# Patient Record
Sex: MALE | Race: WHITE | NOT HISPANIC OR LATINO | Employment: OTHER | URBAN - NONMETROPOLITAN AREA
[De-identification: names, ages, dates, MRNs, and addresses within clinical notes are randomized per-mention and may not be internally consistent; named-entity substitution may affect disease eponyms.]

---

## 2023-08-29 PROBLEM — G62.9 PERIPHERAL NEUROPATHY: Status: ACTIVE | Noted: 2023-08-29

## 2023-08-29 RX ORDER — SPIRONOLACTONE AND HYDROCHLOROTHIAZIDE 25; 25 MG/1; MG/1
TABLET ORAL
COMMUNITY
Start: 2023-03-17

## 2023-08-29 RX ORDER — LOSARTAN POTASSIUM 100 MG/1
TABLET ORAL
COMMUNITY
Start: 2023-03-17

## 2023-08-29 RX ORDER — MELOXICAM 15 MG/1
TABLET ORAL
COMMUNITY
Start: 2023-04-19

## 2023-08-29 RX ORDER — GLIMEPIRIDE 2 MG/1
TABLET ORAL
COMMUNITY
Start: 2023-04-19

## 2023-08-29 RX ORDER — FENOFIBRATE 160 MG/1
TABLET ORAL
COMMUNITY
Start: 2023-03-17

## 2023-08-29 RX ORDER — METFORMIN HYDROCHLORIDE 500 MG/1
TABLET ORAL
COMMUNITY
Start: 2023-03-17

## 2023-08-29 RX ORDER — ATORVASTATIN CALCIUM 80 MG/1
TABLET, FILM COATED ORAL
COMMUNITY
Start: 2023-03-17

## 2023-08-29 RX ORDER — ATENOLOL 25 MG/1
TABLET ORAL
COMMUNITY
Start: 2023-03-17

## 2023-08-29 RX ORDER — AMLODIPINE BESYLATE 10 MG/1
TABLET ORAL
COMMUNITY
Start: 2023-03-17

## 2023-10-05 ENCOUNTER — TELEPHONE VISIT (OUTPATIENT)
Dept: HEMATOLOGY/ONCOLOGY | Facility: CLINIC | Age: 73
End: 2023-10-05
Payer: MEDICARE

## 2023-10-05 ENCOUNTER — APPOINTMENT (OUTPATIENT)
Dept: HEMATOLOGY/ONCOLOGY | Facility: CLINIC | Age: 73
End: 2023-10-05
Payer: MEDICARE

## 2023-10-05 DIAGNOSIS — C91.10 CLL (CHRONIC LYMPHOCYTIC LEUKEMIA) (MULTI): Primary | ICD-10-CM

## 2023-10-05 DIAGNOSIS — C91.10 CLL (CHRONIC LYMPHOCYTIC LEUKEMIA) (MULTI): ICD-10-CM

## 2023-10-05 PROCEDURE — 99213 OFFICE O/P EST LOW 20 MIN: CPT | Performed by: INTERNAL MEDICINE

## 2023-10-05 NOTE — PROGRESS NOTES
Cancer History:   Treatment Synopsis:    Chronic lymphocytic leukemia, stage 0, diagnosed in May 2017. The patient presented with leukocytosis/lymphocytosis.      History of left testicular mass status post orchiectomy on January 11, 2018. 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 Surgical margins negative for malignancy        History of Present Illness:      ID Statement:    GLENDY GILLILAND is a 73 year old Male        Chief Complaint: follow-up   Interval History:    The patient had called for a follow-up on October 5,23 regarding his history of Stage 0 CLL. He is asymptomatic and experiences no fevers or incidences of lymphadenopathy.  Denies any history of night sweats, lymphadenopathy, fevers and localized or systemic infection and bleeding.      Past medical history:  1. Obesity  2. Diabetes mellitus  3. Hypertension  4. Hyperlipidemia     Past surgical history:  1. Both knee replacement in October 2013.  2. Left hip replacement in February 2012.  3. History of prostate cancer, seed implants it [place] [facility] in many 2016.     Review of Systems:   ·  System Review All other systems have been reviewed and are negative for complaint.      · Constitutional NEGATIVE: Weight Loss     Comments no repeated infections or lymphadenopathy      · Hematologic/Lymph NEGATIVE: Night Sweats            Allergies and Intolerances:       Allergies:         No Known Allergies: Active     Outpatient Medication Profile:  * Patient Currently Takes Medications as of 06-Apr-2023 10:17 documented in Structured Notes         fenofibrate 160 mg oral tablet : Last Dose Taken:  , 1 tab(s) orally once a day         metFORMIN 500 mg oral tablet: Last Dose Taken:  , 1 tab(s) orally 2 times  a day         aspirin 81 mg oral tablet, disintegrating: Last Dose Taken:  , 1 tab(s)  orally once a day         Daily Multi oral tablet: Last Dose Taken:  , 1 tab(s) orally once a day         Tylenol 500 mg oral tablet:  Last Dose Taken:  , 2 tab(s) orally every 6  hours, As Needed         spironolactone-hydrochlorothiazide 25 mg-25 mg oral tablet: Last Dose Taken:   , 1 tab(s) orally once a day         amLODIPine 10 mg oral tablet: Last Dose Taken:  , 1 tab(s) orally once  a day         losartan 100 mg oral tablet: Last Dose Taken:  , 1 tab(s) orally once a  day         atenolol 25 mg oral tablet: Last Dose Taken:  , 1 tab(s) orally 2 times  a day         glipiZIDE 10 mg oral tablet: Last Dose Taken:  , 1 tab(s) orally once  a day         atorvastatin 80 mg oral tablet: Last Dose Taken:  , 1 tab(s) orally once  a day         meloxicam 15 mg oral tablet: Last Dose Taken:  , 1 tab(s) orally once  a day             Medical History:         DM (diabetes mellitus): ICD-10: E11.9, Status: Active         HLD (hyperlipidemia): ICD-10: E78.5, Status: Active         HTN (hypertension): ICD-10: I10, Status: Active         Obesity: ICD-10: E66.9, Status: Active        Social History:   Social Substance History:  ·  Smoking Status former smoker   ·  Additional History     Smoked 1 pack per day for 40 years, quit 40 years ago(1)           Vitals and Measurements:   Vitals: Temp: 36.9  HR: 76  RR: 18  BP: 204/72  SPO2%:   96   Measurements: HT(cm): 166.2  WT(kg): 156.6  BSA:  2.68  BMI:  56.6   Second Set of Vitals/Vitals Comment: 126/83 (2)      Physical Exam:      Constitutional: Well developed, awake/alert/oriented  x3, no distress, alert and cooperative   Eyes: PERRL, EOMI, clear sclera   ENMT: mucous membranes moist, no apparent injury,  no lesions seen   Head/Neck: Neck supple, no apparent injury, thyroid  without mass or tenderness, No JVD, trachea midline, no bruits   Respiratory/Thorax: Patent airways, CTAB, normal  breath sounds with good chest expansion, thorax symmetric   Cardiovascular: Regular, rate and rhythm, no murmurs,  2+ equal pulses of the extremities, normal S 1and S 2   Gastrointestinal: Nondistended, soft, non-tender,  no  rebound tenderness or guarding, no masses palpable, no organomegaly, +BS, no bruits   Genitourinary: No Discharge, vesicles or other abnormalities   Musculoskeletal: ROM intact, no joint swelling, normal  strength   Extremities: normal extremities, no cyanosis edema,  contusions or wounds, no clubbing   Neurological: alert and oriented x3, intact senses,  motor, response and reflexes, normal strength   Breast: No masses, tenderness, no discharge or discoloration   Lymphatic: No significant lymphadenopathy   Psychological: Appropriate mood and behavior   Skin: Warm and dry, no lesions, no rashes         Lab Results:     ·  Results        CBC date/time       WBC     HGB     HCT     PLT     Neut      17-May-2017 13:07   14.8(H) 16.2    50.1    231     5.33     BMP date/time       NA              K               CL              CO2             BUN             CREAT             17-May-2017 13:07   143             4.4             106             N/A             24(H)           0.87     LDH date/time       LDH     10-May-2017 10:40   N/A     Assessment and Plan:      Assessment and Plan:   Assessment:    The patient had come for follow-up on 4/6/23.     1. B cell CLL, stage 0  The patient is being followed clinically. Denied any new symptoms of weight loss, fevers, lymphadenopathy, or repeated infection. Physical exam revealed obesity.  CBC on March 23, 2023 revealed WBC 22.92, hemoglobin 14.8 g/dL, platelets 216,000/mm³.   Discussed with the patient and explained that the WBC doubling time is greater than 12 months and therefore is indolent disease. The patient is pleased.      The patient had called for a follow-up on October 5, 23.    WBC doubling time is greater than 12 months and therefore is indolent disease. The patient should be clinically followed  and return in 6 months.      2. Obesity     3. DM  Continue Metformin 500 mg by mouth twice a day.     4. HTN  Continue atenolol 25 mg, amlodipine 10 mg, and losartan  100 mg.      5. HLD  Continue Lipitor 10 mg.     6. The patient is to return in four months.

## 2024-03-05 ENCOUNTER — TELEPHONE (OUTPATIENT)
Dept: HEMATOLOGY/ONCOLOGY | Facility: CLINIC | Age: 74
End: 2024-03-05
Payer: MEDICARE

## 2024-03-05 NOTE — TELEPHONE ENCOUNTER
Dr. Gee patient. Patient called has an appointment with Dr. Gee on April 4th but wanted blood order sent to him so he can get it drawn before hand. Any questions can call him @ [phone]

## 2024-04-02 PROBLEM — Z86.2 PERSONAL HISTORY OF DISEASES OF THE BLOOD AND BLOOD-FORMING ORGANS AND CERTAIN DISORDERS INVOLVING THE IMMUNE MECHANISM: Status: ACTIVE | Noted: 2017-08-14

## 2024-04-02 PROBLEM — C61 PROSTATE CANCER (MULTI): Status: ACTIVE | Noted: 2017-12-21

## 2024-04-02 PROBLEM — C91.10 CLL (CHRONIC LYMPHOCYTIC LEUKEMIA) (MULTI): Status: ACTIVE | Noted: 2018-03-14

## 2024-04-02 PROBLEM — N50.89 TESTICULAR MASS: Status: ACTIVE | Noted: 2018-01-02

## 2024-04-02 PROBLEM — Z85.46 PERSONAL HISTORY OF MALIGNANT NEOPLASM OF PROSTATE: Status: ACTIVE | Noted: 2017-07-18

## 2024-04-02 PROBLEM — I44.0 FIRST DEGREE AV BLOCK: Status: ACTIVE | Noted: 2023-05-04

## 2024-04-02 PROBLEM — R94.31 ABNORMAL EKG: Status: ACTIVE | Noted: 2023-05-04

## 2024-04-02 PROBLEM — E11.9 TYPE 2 DIABETES MELLITUS WITHOUT COMPLICATION, WITHOUT LONG-TERM CURRENT USE OF INSULIN (MULTI): Status: ACTIVE | Noted: 2017-01-10

## 2024-04-02 PROBLEM — I10 ESSENTIAL HYPERTENSION: Status: ACTIVE | Noted: 2017-01-10

## 2024-04-02 PROBLEM — R06.00 DYSPNEA: Status: ACTIVE | Noted: 2023-05-04

## 2024-04-02 PROBLEM — R13.10 DYSPHAGIA: Status: ACTIVE | Noted: 2023-05-04

## 2024-04-02 PROBLEM — G47.33 OSA TREATED WITH BIPAP: Status: ACTIVE | Noted: 2018-03-14

## 2024-04-02 PROBLEM — E78.2 MIXED HYPERLIPIDEMIA: Status: ACTIVE | Noted: 2017-01-10

## 2024-04-02 PROBLEM — R79.89 AZOTEMIA: Status: ACTIVE | Noted: 2023-02-07

## 2024-04-02 PROBLEM — I49.1 PAC (PREMATURE ATRIAL CONTRACTION): Status: ACTIVE | Noted: 2023-05-04

## 2024-04-02 RX ORDER — EMPAGLIFLOZIN 10 MG/1
10 TABLET, FILM COATED ORAL
COMMUNITY
Start: 2024-01-02

## 2024-04-02 RX ORDER — DULAGLUTIDE 1.5 MG/.5ML
INJECTION, SOLUTION SUBCUTANEOUS
COMMUNITY
Start: 2024-01-09 | End: 2024-04-04 | Stop reason: ALTCHOICE

## 2024-04-02 RX ORDER — HYDROCHLOROTHIAZIDE 25 MG/1
TABLET ORAL
COMMUNITY
Start: 2023-12-14

## 2024-04-02 RX ORDER — DULAGLUTIDE 0.75 MG/.5ML
INJECTION, SOLUTION SUBCUTANEOUS
COMMUNITY
Start: 2023-11-07 | End: 2024-04-04 | Stop reason: ALTCHOICE

## 2024-04-02 RX ORDER — SODIUM CHLORIDE 0.9 % (FLUSH) 0.9 %
10 SYRINGE (ML) INJECTION
COMMUNITY
Start: 2023-05-04 | End: 2024-08-02

## 2024-04-04 ENCOUNTER — OFFICE VISIT (OUTPATIENT)
Dept: HEMATOLOGY/ONCOLOGY | Facility: CLINIC | Age: 74
End: 2024-04-04
Payer: MEDICARE

## 2024-04-04 VITALS
RESPIRATION RATE: 16 BRPM | OXYGEN SATURATION: 93 % | BODY MASS INDEX: 56.26 KG/M2 | WEIGHT: 315 LBS | DIASTOLIC BLOOD PRESSURE: 79 MMHG | HEART RATE: 90 BPM | TEMPERATURE: 97.5 F | SYSTOLIC BLOOD PRESSURE: 159 MMHG

## 2024-04-04 DIAGNOSIS — C91.10 CLL (CHRONIC LYMPHOCYTIC LEUKEMIA) (MULTI): ICD-10-CM

## 2024-04-04 PROBLEM — E87.5 HYPERKALEMIA: Status: ACTIVE | Noted: 2024-04-04

## 2024-04-04 PROBLEM — N18.30 STAGE 3 CHRONIC KIDNEY DISEASE (MULTI): Status: ACTIVE | Noted: 2023-12-04

## 2024-04-04 PROCEDURE — 1036F TOBACCO NON-USER: CPT | Performed by: INTERNAL MEDICINE

## 2024-04-04 PROCEDURE — 99213 OFFICE O/P EST LOW 20 MIN: CPT | Performed by: INTERNAL MEDICINE

## 2024-04-04 PROCEDURE — 1159F MED LIST DOCD IN RCRD: CPT | Performed by: INTERNAL MEDICINE

## 2024-04-04 PROCEDURE — 3077F SYST BP >= 140 MM HG: CPT | Performed by: INTERNAL MEDICINE

## 2024-04-04 PROCEDURE — 3078F DIAST BP <80 MM HG: CPT | Performed by: INTERNAL MEDICINE

## 2024-04-04 PROCEDURE — 1126F AMNT PAIN NOTED NONE PRSNT: CPT | Performed by: INTERNAL MEDICINE

## 2024-04-04 PROCEDURE — 4010F ACE/ARB THERAPY RXD/TAKEN: CPT | Performed by: INTERNAL MEDICINE

## 2024-04-04 RX ORDER — DULAGLUTIDE 3 MG/.5ML
INJECTION, SOLUTION SUBCUTANEOUS
COMMUNITY
Start: 2024-03-29

## 2024-04-04 ASSESSMENT — ENCOUNTER SYMPTOMS
DEPRESSION: 0
OCCASIONAL FEELINGS OF UNSTEADINESS: 1
LOSS OF SENSATION IN FEET: 0

## 2024-04-04 ASSESSMENT — PAIN SCALES - GENERAL: PAINLEVEL: 0-NO PAIN

## 2024-04-04 NOTE — PROGRESS NOTES
Cancer History:   Treatment Synopsis:    Chronic lymphocytic leukemia, stage 0, diagnosed in May 2017. The patient presented with leukocytosis/lymphocytosis.      History of left testicular mass status post orchiectomy on January 11, 2018. 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 Surgical margins negative for malignancy        History of Present Illness:      ID Statement:    GLENDY GILLILAND is a 73 year old Male        Chief Complaint: follow-up   Interval History:    The patient had called for a follow-up on 4/4/24 regarding his history of Stage 0 CLL. He is asymptomatic and experiences no fevers or incidences of lymphadenopathy.  Denies any history of night sweats, lymphadenopathy, fevers and localized or systemic infection and bleeding.      Past medical history:  1. Obesity  2. Diabetes mellitus  3. Hypertension  4. Hyperlipidemia   5. Sleep apnea, on CPAP  Past surgical history:  1. Both knee replacement in October 2013.  2. Left hip replacement in February 2012.  3. History of prostate cancer, seed implants it [place] [facility] in many 2016.     Review of Systems:   ·  System Review All other systems have been reviewed and are negative for complaint.      · Constitutional NEGATIVE: Weight Loss     Comments no repeated infections or lymphadenopathy      · Hematologic/Lymph NEGATIVE: Night Sweats            Allergies and Intolerances:       Allergies:         No Known Allergies: Active     Outpatient Medication Profile:  * Patient Currently Takes Medications as of 06-Apr-2023 10:17 documented in Structured Notes         fenofibrate 160 mg oral tablet : Last Dose Taken:  , 1 tab(s) orally once a day         metFORMIN 500 mg oral tablet: Last Dose Taken:  , 1 tab(s) orally 2 times  a day         aspirin 81 mg oral tablet, disintegrating: Last Dose Taken:  , 1 tab(s)  orally once a day         Daily Multi oral tablet: Last Dose Taken:  , 1 tab(s) orally once a day         Tylenol 500 mg  oral tablet: Last Dose Taken:  , 2 tab(s) orally every 6  hours, As Needed         spironolactone-hydrochlorothiazide 25 mg-25 mg oral tablet: Last Dose Taken:   , 1 tab(s) orally once a day         amLODIPine 10 mg oral tablet: Last Dose Taken:  , 1 tab(s) orally once  a day         losartan 100 mg oral tablet: Last Dose Taken:  , 1 tab(s) orally once a  day         atenolol 25 mg oral tablet: Last Dose Taken:  , 1 tab(s) orally 2 times  a day         glipiZIDE 10 mg oral tablet: Last Dose Taken:  , 1 tab(s) orally once  a day         atorvastatin 80 mg oral tablet: Last Dose Taken:  , 1 tab(s) orally once  a day         meloxicam 15 mg oral tablet: Last Dose Taken:  , 1 tab(s) orally once  a day             Medical History:         DM (diabetes mellitus): ICD-10: E11.9, Status: Active         HLD (hyperlipidemia): ICD-10: E78.5, Status: Active         HTN (hypertension): ICD-10: I10, Status: Active         Obesity: ICD-10: E66.9, Status: Active         Social History:   Social Substance History:  ·  Smoking Status former smoker   ·  Additional History     Smoked 1 pack per day for 40 years, quit 40 years ago(1)           Vitals and Measurements:   Vitals: Temp: 36.9  HR: 76  RR: 18  BP: 204/72  SPO2%:   96   Measurements: HT(cm): 166.2  WT(kg): 156.6  BSA:  2.68  BMI:  56.6   Second Set of Vitals/Vitals Comment: 126/83 (2)      Physical Exam:      Constitutional: Well developed, awake/alert/oriented  x3, no distress, alert and cooperative   Eyes: PERRL, EOMI, clear sclera   ENMT: mucous membranes moist, no apparent injury,  no lesions seen   Head/Neck: Neck supple, no apparent injury, thyroid  without mass or tenderness, No JVD, trachea midline, no bruits   Respiratory/Thorax: Patent airways, CTAB, normal  breath sounds with good chest expansion, thorax symmetric   Cardiovascular: Regular, rate and rhythm, no murmurs,  2+ equal pulses of the extremities, normal S 1and S 2   Gastrointestinal: Nondistended, soft,  non-tender,  no rebound tenderness or guarding, no masses palpable, no organomegaly, +BS, no bruits   Genitourinary: No Discharge, vesicles or other abnormalities   Musculoskeletal: ROM intact, no joint swelling, normal  strength   Extremities: normal extremities, no cyanosis edema,  contusions or wounds, no clubbing   Neurological: alert and oriented x3, intact senses,  motor, response and reflexes, normal strength   Breast: No masses, tenderness, no discharge or discoloration   Lymphatic: No significant lymphadenopathy   Psychological: Appropriate mood and behavior   Skin: Warm and dry, no lesions, no rashes         Lab Results:           Assessment and Plan:         The patient had come for follow-up on 4/ 4/24     1. B cell CLL, stage 0  The patient is being followed clinically. Denied any new symptoms of weight loss, fevers, lymphadenopathy, or repeated infection. Physical exam revealed obesity.  CBC on March 20,2024 revealed WBC 22.47, hemoglobin 16.7 g/dL, platelets 207,000/mm³.   Discussed with the patient and explained that the WBC doubling time is greater than 12 months and therefore is indolent disease. The patient is pleased.      The patient had called for a follow-up on October 5, 23.    WBC doubling time is greater than 12 months and therefore is indolent disease. The patient should be clinically followed  and return in 6 months.      2. Obesity/sleep apnea on CPAP     3. DM  Continue Metformin 500 mg by mouth twice a day.     4. HTN  Continue atenolol 25 mg, amlodipine 10 mg, and losartan 100 mg.      5. HLD  Continue Lipitor 10 mg.     6. The patient is to return in six months.

## 2024-10-04 ENCOUNTER — OFFICE VISIT (OUTPATIENT)
Dept: HEMATOLOGY/ONCOLOGY | Facility: CLINIC | Age: 74
End: 2024-10-04
Payer: MEDICARE

## 2024-10-04 VITALS
BODY MASS INDEX: 54.34 KG/M2 | DIASTOLIC BLOOD PRESSURE: 87 MMHG | SYSTOLIC BLOOD PRESSURE: 122 MMHG | RESPIRATION RATE: 18 BRPM | OXYGEN SATURATION: 94 % | WEIGHT: 315 LBS | HEART RATE: 89 BPM | TEMPERATURE: 97.7 F

## 2024-10-04 DIAGNOSIS — C91.10 CLL (CHRONIC LYMPHOCYTIC LEUKEMIA) (MULTI): ICD-10-CM

## 2024-10-04 PROCEDURE — 99214 OFFICE O/P EST MOD 30 MIN: CPT | Performed by: INTERNAL MEDICINE

## 2024-10-04 PROCEDURE — 1159F MED LIST DOCD IN RCRD: CPT | Performed by: INTERNAL MEDICINE

## 2024-10-04 PROCEDURE — 3079F DIAST BP 80-89 MM HG: CPT | Performed by: INTERNAL MEDICINE

## 2024-10-04 PROCEDURE — 3074F SYST BP LT 130 MM HG: CPT | Performed by: INTERNAL MEDICINE

## 2024-10-04 PROCEDURE — 1126F AMNT PAIN NOTED NONE PRSNT: CPT | Performed by: INTERNAL MEDICINE

## 2024-10-04 PROCEDURE — 4010F ACE/ARB THERAPY RXD/TAKEN: CPT | Performed by: INTERNAL MEDICINE

## 2024-10-04 ASSESSMENT — PAIN SCALES - GENERAL: PAINLEVEL: 0-NO PAIN

## 2024-10-04 NOTE — PROGRESS NOTES
Cancer History:   Treatment Synopsis:    Chronic lymphocytic leukemia, stage 0, diagnosed in May 2017. The patient presented with leukocytosis/lymphocytosis.      History of left testicular mass status post orchiectomy on January 11, 2018. 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 Surgical margins negative for malignancy        History of Present Illness:      ID Statement:    GLENDY GILLILAND is a 73 year old Male        Chief Complaint: follow-up   Interval History:    The patient had called for a follow-up on 10/4/24 regarding his history of Stage 0 CLL. He is asymptomatic and experiences no fevers or incidences of lymphadenopathy.  Denies any history of night sweats, lymphadenopathy, fevers and localized or systemic infection and bleeding.      Past medical history:  1. Obesity  2. Diabetes mellitus  3. Hypertension  4. Hyperlipidemia   5. Sleep apnea, on CPAP  Past surgical history:  1. Both knee replacement in October 2013.  2. Left hip replacement in February 2012.  3. History of prostate cancer, seed implants it [place] [facility] in many 2016.     Review of Systems:   ·  System Review All other systems have been reviewed and are negative for complaint.      · Constitutional NEGATIVE: Weight Loss     Comments no repeated infections or lymphadenopathy      · Hematologic/Lymph NEGATIVE: Night Sweats            Allergies and Intolerances:       Allergies:         No Known Allergies: Active     Outpatient Medication Profile:  * Patient Currently Takes Medications as of 06-Apr-2023 10:17 documented in Structured Notes         fenofibrate 160 mg oral tablet : Last Dose Taken:  , 1 tab(s) orally once a day         metFORMIN 500 mg oral tablet: Last Dose Taken:  , 1 tab(s) orally 2 times  a day         aspirin 81 mg oral tablet, disintegrating: Last Dose Taken:  , 1 tab(s)  orally once a day         Daily Multi oral tablet: Last Dose Taken:  , 1 tab(s) orally once a day         Tylenol 500  mg oral tablet: Last Dose Taken:  , 2 tab(s) orally every 6  hours, As Needed         spironolactone-hydrochlorothiazide 25 mg-25 mg oral tablet: Last Dose Taken:   , 1 tab(s) orally once a day         amLODIPine 10 mg oral tablet: Last Dose Taken:  , 1 tab(s) orally once  a day         losartan 100 mg oral tablet: Last Dose Taken:  , 1 tab(s) orally once a  day         atenolol 25 mg oral tablet: Last Dose Taken:  , 1 tab(s) orally 2 times  a day         glipiZIDE 10 mg oral tablet: Last Dose Taken:  , 1 tab(s) orally once  a day         atorvastatin 80 mg oral tablet: Last Dose Taken:  , 1 tab(s) orally once  a day         meloxicam 15 mg oral tablet: Last Dose Taken:  , 1 tab(s) orally once  a day             Medical History:         DM (diabetes mellitus): ICD-10: E11.9, Status: Active         HLD (hyperlipidemia): ICD-10: E78.5, Status: Active         HTN (hypertension): ICD-10: I10, Status: Active         Obesity: ICD-10: E66.9, Status: Active         Social History:   Social Substance History:  ·  Smoking Status former smoker   ·  Additional History     Smoked 1 pack per day for 40 years, quit 40 years ago(1)           Vitals and Measurements:   Vitals: Temp: 36.9  HR: 76  RR: 18  BP: 204/72  SPO2%:   96   Measurements: HT(cm): 166.2  WT(kg): 156.6  BSA:  2.68  BMI:  56.6   Second Set of Vitals/Vitals Comment: 126/83 (2)      Physical Exam:      Constitutional: Well developed, awake/alert/oriented  x3, no distress, alert and cooperative   Eyes: PERRL, EOMI, clear sclera   ENMT: mucous membranes moist, no apparent injury,  no lesions seen   Head/Neck: Neck supple, no apparent injury, thyroid  without mass or tenderness, No JVD, trachea midline, no bruits   Respiratory/Thorax: Patent airways, CTAB, normal  breath sounds with good chest expansion, thorax symmetric   Cardiovascular: Regular, rate and rhythm, no murmurs,  2+ equal pulses of the extremities, normal S 1and S 2   Gastrointestinal: Nondistended, soft,  non-tender,  no rebound tenderness or guarding, no masses palpable, no organomegaly, +BS, no bruits   Genitourinary: No Discharge, vesicles or other abnormalities   Musculoskeletal: ROM intact, no joint swelling, normal  strength   Extremities: normal extremities, no cyanosis edema,  contusions or wounds, no clubbing   Neurological: alert and oriented x3, intact senses,  motor, response and reflexes, normal strength   Breast: No masses, tenderness, no discharge or discoloration   Lymphatic: No significant lymphadenopathy   Psychological: Appropriate mood and behavior   Skin: Warm and dry, no lesions, no rashes         Lab Results:           Assessment and Plan:         The patient had come for follow-up on 10/ 4/24     1. B cell CLL, stage 0  The patient is being followed clinically. Denied any new symptoms of weight loss, fevers, lymphadenopathy, or repeated infection. Physical exam revealed obesity.  CBC on September 27, 2024 revealed WBC 29.03, hemoglobin 16.9 g/dL, platelets 206,000/mm³.  Discussed with the patient and explained that the WBC doubling time is greater than 12 months and therefore is indolent disease. The patient is pleased.  Return in 6 months.      2.. Obesity/sleep apnea on CPAP     3. DM  Continue Metformin 500 mg by mouth twice a day.     4. HTN  Continue atenolol 25 mg, amlodipine 10 mg, and losartan 100 mg.      5. HLD  Continue Lipitor 10 mg.     6. The patient is to return in six months.

## 2025-04-04 ENCOUNTER — APPOINTMENT (OUTPATIENT)
Dept: HEMATOLOGY/ONCOLOGY | Facility: CLINIC | Age: 75
End: 2025-04-04
Payer: MEDICARE

## 2025-04-10 ENCOUNTER — OFFICE VISIT (OUTPATIENT)
Dept: HEMATOLOGY/ONCOLOGY | Facility: CLINIC | Age: 75
End: 2025-04-10
Payer: MEDICARE

## 2025-04-10 VITALS
HEART RATE: 99 BPM | HEIGHT: 65 IN | RESPIRATION RATE: 16 BRPM | TEMPERATURE: 98.1 F | OXYGEN SATURATION: 93 % | WEIGHT: 315 LBS | SYSTOLIC BLOOD PRESSURE: 144 MMHG | DIASTOLIC BLOOD PRESSURE: 92 MMHG | BODY MASS INDEX: 52.48 KG/M2

## 2025-04-10 DIAGNOSIS — C91.10 CLL (CHRONIC LYMPHOCYTIC LEUKEMIA) (MULTI): ICD-10-CM

## 2025-04-10 PROCEDURE — 3077F SYST BP >= 140 MM HG: CPT | Performed by: INTERNAL MEDICINE

## 2025-04-10 PROCEDURE — 1126F AMNT PAIN NOTED NONE PRSNT: CPT | Performed by: INTERNAL MEDICINE

## 2025-04-10 PROCEDURE — 4010F ACE/ARB THERAPY RXD/TAKEN: CPT | Performed by: INTERNAL MEDICINE

## 2025-04-10 PROCEDURE — 3008F BODY MASS INDEX DOCD: CPT | Performed by: INTERNAL MEDICINE

## 2025-04-10 PROCEDURE — 1159F MED LIST DOCD IN RCRD: CPT | Performed by: INTERNAL MEDICINE

## 2025-04-10 PROCEDURE — 99214 OFFICE O/P EST MOD 30 MIN: CPT | Performed by: INTERNAL MEDICINE

## 2025-04-10 PROCEDURE — 3080F DIAST BP >= 90 MM HG: CPT | Performed by: INTERNAL MEDICINE

## 2025-04-10 RX ORDER — DULAGLUTIDE 4.5 MG/.5ML
INJECTION, SOLUTION SUBCUTANEOUS
COMMUNITY
Start: 2024-09-04 | End: 2025-04-10 | Stop reason: ALTCHOICE

## 2025-04-10 RX ORDER — DEXTROMETHORPHAN HYDROBROMIDE, GUAIFENESIN 5; 100 MG/5ML; MG/5ML
LIQUID ORAL
COMMUNITY

## 2025-04-10 RX ORDER — GLIMEPIRIDE 4 MG/1
4 TABLET ORAL
COMMUNITY
Start: 2025-03-03

## 2025-04-10 ASSESSMENT — PAIN SCALES - GENERAL: PAINLEVEL_OUTOF10: 0-NO PAIN

## 2025-04-10 NOTE — PROGRESS NOTES
Patient for follow up in 6 months with labs at [facility] in [place].  Lab reqs given to patient.  No further needs or questions. Patient independently ambulatory off unit in [facility] and without complaints.  Gait steady.  Call back instructions reviewed.  Patient verbalized understanding.

## 2025-04-10 NOTE — PROGRESS NOTES
Cancer History:   Treatment Synopsis:    Chronic lymphocytic leukemia, stage 0, diagnosed in May 2017. The patient presented with leukocytosis/lymphocytosis.      History of left testicular mass status post orchiectomy on January 11, 2018. 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 Surgical margins negative for malignancy        History of Present Illness:      ID Statement:    GLENDY GILLILAND is a 73 year old Male        Chief Complaint: follow-up   Interval History:    The patient had called for a follow-up on 4/10/25 regarding his history of Stage 0 CLL. He is asymptomatic and experiences no fevers or incidences of lymphadenopathy.  Denies any history of night sweats, lymphadenopathy, fevers and localized or systemic infection and bleeding.      Past medical history:  1. Obesity  2. Diabetes mellitus  3. Hypertension  4. Hyperlipidemia   5. Sleep apnea, on CPAP  Past surgical history:  1. Both knee replacement in October 2013.  2. Left hip replacement in February 2012.  3. History of prostate cancer, seed implants it [place] [facility] in many 2016.     Review of Systems:   ·  System Review All other systems have been reviewed and are negative for complaint.      · Constitutional NEGATIVE: Weight Loss     Comments no repeated infections or lymphadenopathy      · Hematologic/Lymph NEGATIVE: Night Sweats            Allergies and Intolerances:       Allergies:         No Known Allergies: Active     Outpatient Medication Profile:  * Patient Currently Takes Medications as of 06-Apr-2023 10:17 documented in Structured Notes         fenofibrate 160 mg oral tablet : Last Dose Taken:  , 1 tab(s) orally once a day         metFORMIN 500 mg oral tablet: Last Dose Taken:  , 1 tab(s) orally 2 times  a day         aspirin 81 mg oral tablet, disintegrating: Last Dose Taken:  , 1 tab(s)  orally once a day         Daily Multi oral tablet: Last Dose Taken:  , 1 tab(s) orally once a day         Tylenol 500  ptt 177 mg oral tablet: Last Dose Taken:  , 2 tab(s) orally every 6  hours, As Needed         spironolactone-hydrochlorothiazide 25 mg-25 mg oral tablet: Last Dose Taken:   , 1 tab(s) orally once a day         amLODIPine 10 mg oral tablet: Last Dose Taken:  , 1 tab(s) orally once  a day         losartan 100 mg oral tablet: Last Dose Taken:  , 1 tab(s) orally once a  day         atenolol 25 mg oral tablet: Last Dose Taken:  , 1 tab(s) orally 2 times  a day         glipiZIDE 10 mg oral tablet: Last Dose Taken:  , 1 tab(s) orally once  a day         atorvastatin 80 mg oral tablet: Last Dose Taken:  , 1 tab(s) orally once  a day         meloxicam 15 mg oral tablet: Last Dose Taken:  , 1 tab(s) orally once  a day             Medical History:         DM (diabetes mellitus): ICD-10: E11.9, Status: Active         HLD (hyperlipidemia): ICD-10: E78.5, Status: Active         HTN (hypertension): ICD-10: I10, Status: Active         Obesity: ICD-10: E66.9, Status: Active         Social History:   Social Substance History:  ·  Smoking Status former smoker   ·  Additional History     Smoked 1 pack per day for 40 years, quit 40 years ago(1)           Vitals and Measurements:   Vitals: Temp: 36.9  HR: 76  RR: 18  BP: 204/72  SPO2%:   96   Measurements: HT(cm): 166.2  WT(kg): 156.6  BSA:  2.68  BMI:  56.6   Second Set of Vitals/Vitals Comment: 126/83 (2)      Physical Exam:      Constitutional: Well developed, awake/alert/oriented  x3, no distress, alert and cooperative   Eyes: PERRL, EOMI, clear sclera   ENMT: mucous membranes moist, no apparent injury,  no lesions seen   Head/Neck: Neck supple, no apparent injury, thyroid  without mass or tenderness, No JVD, trachea midline, no bruits   Respiratory/Thorax: Patent airways, CTAB, normal  breath sounds with good chest expansion, thorax symmetric   Cardiovascular: Regular, rate and rhythm, no murmurs,  2+ equal pulses of the extremities, normal S 1and S 2   Gastrointestinal: Nondistended, soft,  non-tender,  no rebound tenderness or guarding, no masses palpable, no organomegaly, +BS, no bruits   Genitourinary: No Discharge, vesicles or other abnormalities   Musculoskeletal: ROM intact, no joint swelling, normal  strength   Extremities: normal extremities, no cyanosis edema,  contusions or wounds, no clubbing   Neurological: alert and oriented x3, intact senses,  motor, response and reflexes, normal strength   Breast: No masses, tenderness, no discharge or discoloration   Lymphatic: No significant lymphadenopathy   Psychological: Appropriate mood and behavior   Skin: Warm and dry, no lesions, no rashes         Lab Results:           Assessment and Plan:         The patient had come for follow-up on 4/10/25     1. B cell CLL, stage 0  The patient is being followed clinically. Denied any new symptoms of weight loss, fevers, lymphadenopathy, or repeated infection. Physical exam revealed obesity.  CBC on  4/2/25 revealed WBC 30.27, hemoglobin 16.5 g/dL, platelets 253,000/mm³.  Discussed with the patient and explained that the WBC doubling time is greater than 12 months and therefore is indolent disease. The patient is pleased.  Return in 6 months.      2.. Obesity/sleep apnea on CPAP     3. DM  Continue Metformin 500 mg by mouth twice a day.     4. HTN  Continue atenolol 25 mg, amlodipine 10 mg, and losartan 100 mg.      5. HLD  Continue Lipitor 10 mg.     6. The patient is to return in six months.    ptt 177.4